# Patient Record
Sex: FEMALE
[De-identification: names, ages, dates, MRNs, and addresses within clinical notes are randomized per-mention and may not be internally consistent; named-entity substitution may affect disease eponyms.]

---

## 2019-09-19 ENCOUNTER — HOSPITAL ENCOUNTER (OUTPATIENT)
Dept: HOSPITAL 92 - L&D/OP | Age: 29
Discharge: HOME | End: 2019-09-19
Attending: OBSTETRICS & GYNECOLOGY
Payer: SELF-PAY

## 2019-09-19 VITALS — TEMPERATURE: 98.7 F

## 2019-09-19 VITALS — BODY MASS INDEX: 40.9 KG/M2

## 2019-09-19 DIAGNOSIS — R10.9: ICD-10-CM

## 2019-09-19 DIAGNOSIS — R11.0: ICD-10-CM

## 2019-09-19 DIAGNOSIS — Z3A.28: ICD-10-CM

## 2019-09-19 DIAGNOSIS — O99.89: Primary | ICD-10-CM

## 2019-09-19 DIAGNOSIS — Z79.82: ICD-10-CM

## 2019-09-19 DIAGNOSIS — O24.415: ICD-10-CM

## 2019-09-19 LAB
BACTERIA UR QL AUTO: (no result) HPF
GLUCOSE SERPL-MCNC: 72 MG/DL (ref 70–105)
LEUKOCYTE ESTERASE UR QL STRIP.AUTO: 75 LEU/UL
RBC UR QL AUTO: (no result) HPF (ref 0–3)
WBC UR QL AUTO: (no result) HPF (ref 0–3)

## 2019-09-19 PROCEDURE — 82947 ASSAY GLUCOSE BLOOD QUANT: CPT

## 2019-09-19 PROCEDURE — 81001 URINALYSIS AUTO W/SCOPE: CPT

## 2019-09-19 PROCEDURE — 36416 COLLJ CAPILLARY BLOOD SPEC: CPT

## 2019-09-19 PROCEDURE — 36415 COLL VENOUS BLD VENIPUNCTURE: CPT

## 2019-09-19 NOTE — PDOC.FPROB
FMR OB H&P: HPI





- History of Present Illness


Chief Complaint: nausea, abd pain


Indentification: 27 yo  at 28.6 wga by 1T U/S


History of Present Illness: 





Pt here complaining of abd pain felt like cramping all over abdomen this AM at 

0300 and lasted until 0800. Felt "bubbly" and tried drinking water. Sparkling 

water made it worse. Reports eating chipotle last night and her  had abd 

pain and nausea too. Pt reports nausea without vomiting. After abd pain resolved

, pt reports lightheadedness when getting up to go to work. This worsened w/ 

activity. She last ate around 1 PM and has not been taking much po water lately 

but has been urinating more. Sick contacts include colleagues at work who have 

cough, cold sx, and abdominal pain. Has 2nd grader at home who goes to school; 

he has not been sick. Pt called [facility] and was instructed to buy pepcid for acid 

reflux but to come here if she did not get better. She did not take pepcid as 

she felt worse when she went to pick it up. 





Pt has GDM. Fasting sugar this AM was 107, which is higher than normal for her. 

At home 3 hr postprandial was 147.





Pt's nausea, abd pain, and lightheadedness had resolved at the time she was 

seen.


Primary Care Physician: 





KIESHAC: Ryann





FMR OB H&P: Current Pregnancy





- Prenatal Care


: 3


Para: 1011


Gestational age: 28.6


Due date: 2019


Dating Criteria: 1T U/S


Course/Complications: 





1. GDM on metformin





FMR OB H&P: History





- Past Medical History


PMH: 





None





- OB History


OB History: 





1st pregnancy:  at term, no complications


2nd: SAB at <13 wga








- GYN History


GYN History: 





denies





- Surgical History


Sx History: 





denies





- Social History


Social History: 





Lives at home w/  and son.


Denies smoking, alcohol, drugs





FMR OB H&P: Medications





- Current


Home Medications: 


 











 Medication  Instructions  Recorded  Confirmed  Type


 


Aspirin [Ecotrin] 81 mg PO DAILY 19 History


 


metFORMIN [Glucophage] 1 tab PO DAILY 19 History











Allergies/Adverse Reactions: 


 Allergies











Allergy/AdvReac Type Severity Reaction Status Date / Time


 


No Known Allergies Allergy   Unverified 19 18:35














FMR OB H&P: ROS





- Review of Systems


General: reports: fever/chills (chills), night sweats, fatigue.  denies: weight/

appetite/sleep changes


Eyes: denies: vision changes


ENT: denies: nasal congestion, rhinorrhea, sinus pain/pressure, sore throat


Cardiovascular: denies: chest pain, edema


Respiratory: reports: shortness of breath (minimal, no).  denies: cough


Gastrointestinal: reports: abdominal pain, cramping, nausea.  denies: vomiting, 

diarrhea, constipation


Genitourinary (Female): reports: polyuria.  denies: incontinence, dysuria, 

hematuria, vaginal discharge, vaginal pain, vaginal bleeding, contractions, 

vaginal pressure


Musculoskeletal: denies: pain, stiffness


Neurologic: denies: numbness, syncope, seizures, weakness, loss of 

counsciousness, headache


Integumentary: denies: itching, rash


Breast: denies: skin changes


Endocrine: reports: polyuria.  denies: polydipsia, polyphagia


Hematologic/Lymphatic: denies: prolonged or excessive bleeding


Psychological: denies: depression, anxiety





FMR OB H&P: Vital Signs





- Maternal


Vital signs: 


 Vital Signs - First Documented











Temp


 


 98.7 F 


 


 19 18:27





/72


HR 98








- Fetal Heart Tones


Baseline: 140 (reactive)


Variability: moderate


Acceleration: present


Deceleration: variable (intermittent)


[place] contractions every: none





FMR OB H&P: Physical Exam





- Physical Exam


General: NAD, awake, alert and oriented


HEENT: normocephalic and atraumatic, conjunctiva clear, no scleral icterus, 

grossly normal hearing


Neck: supple, trachea midline, no LAD


Heart: RRR, normal S1/S2, no murmurs/rubs/gallops, no edema


General: CTAB, no respiratory distress


Abdomen: soft, gravid, non-tender, bowel sound present


Skin: no rash


Lymphatic: no unusual bruising or bleeding, no purpura, no petechia


Psychiatric: intact recent and remote memory, normal mood and affect





- Pelvic Exam


SVE: deferred





FMR OB H&P: Results





- Labs


Lab results: 





accucheck b





FMR OB H&P: A/P


Disposition: 





Observe on L&D


Discussion: 


Date/Time: 19 1950





Nausea, abd pain:


- Pt pain resolved by the time we spoke to her


- VSS


- bG 62, pt was given food and water


- UA pending, but not concerned for any abnormal findings.


- Pt will be discharged home.


- Return precautions given


- Encouraged PO water and fluid intake frequently especially while at work


- Patient may take tums or pepcid for GERD symptoms





This H&P was discussed with Dr. Johnson who agrees with the above documentation 

and plan.





Signature: 





TARIQ Duran MD


[id number]

## 2019-10-31 NOTE — ULT
EXAM:

Right lower extremity venous Doppler



HISTORY:

Right lower extremity swelling/edema in pregnant female patient.



FINDINGS:

Grayscale, color-flow, Doppler evaluation, spectral analysis of the right lower extremity venous stru
ctures is performed with 2-D imaging. The right common femoral, superficial femoral, popliteal,

posterior tibial, proximal greater saphenous and profunda femoral veins are imaged.



There is normal luminal compressibility, flow, and augmentation in the visualized deep venous structu
res of the right lower extremity.



IMPRESSION:

No evidence of a deep vein thrombosis in the visualized deep venous structures right lower extremity.




Reported By: Rodney Nathan 

Electronically Signed:  10/31/2019 1:22 PM

## 2019-11-14 NOTE — PDOC.FPROB
FMR OB H&P: HPI





- History of Present Illness


Chief Complaint: Elevated Blood Pressures


History of Present Illness: 


28 yo F  @ 36.6 wks by 1T US with gestational DM who presents for 

elevated blood pressures from clinic. She says she has been feeling fine. She 

checks her BG 4x/d and she says blood glucose remains <120. She started taking 

Metformin 2 months ago. Also, she started taking ASA 81 1 month ago. She has 

had 2 high blood pressure readings 1 week a part (140/79, 143/88). She denies 

any vaginal bleeding/discharge, LOF, nausea, vomiting, dysuria, Headache, and 

vision changes. She endorses swelling, mostly in feet but from time to time in 

hands and in face when she wakes.


Primary Care Physician: 


ROME Lewis





FMR OB H&P: Current Pregnancy





- Prenatal Care


: 3


Para: 1011


Gestational age: 36.6





- OB Labs


Blood type: AB


RH: positive


Antibody Screen: negative


HIV: negative


RPR: negative


HepBsAg: negative


Rubella: immune


Gonorrhea: negative


Chlamydia: negative


3 hour GTT: 99, 192, 203, 200


A1c: 5.8


GBS: positive


H&H: 11.9/34.5


Platelets: 340





FMR OB H&P: History





- Past Medical History


PMH: 


None





- OB History


OB History: 


Gestational DM





- GYN History


GYN History: 


Ab pap in 2013 LGSIL in 2013. Colpo in 2013 was normal.





- Surgical History


Sx History: 


None





- Social History


Social History: 


No alcohol, drugs, or tobacco use.





- Family History


Family History: 


Father: DM


Jaundice: Daughter


Asthma: No one





FMR OB H&P: Medications





- Current


Home Medications: 


 











 Medication  Instructions  Recorded  Confirmed  Type


 


Aspirin Chewable 81 mg PO DAILY 19 History


 


Ferrous Sulfate 324 mg PO BIDPC 19 History


 


metFORMIN [Glucophage] 500 mg PO BID-WM 19 History











Allergies/Adverse Reactions: 


 Allergies











Allergy/AdvReac Type Severity Reaction Status Date / Time


 


No Known Allergies Allergy   Unverified 19 13:06














FMR OB H&P: ROS





- Review of Systems


General: denies: fever/chills


Eyes: denies: vision changes


ENT: denies: nasal congestion, sore throat


Cardiovascular: reports: edema.  denies: chest pain


Respiratory: reports: shortness of breath.  denies: cough, congestion


Gastrointestinal: denies: abdominal pain, nausea, vomiting, diarrhea, 

constipation


Genitourinary (Female): denies: dysuria, vaginal discharge, vaginal bleeding, 

contractions


Musculoskeletal: denies: pain, tenderness


Neurologic: denies: numbness, weakness


Integumentary: denies: rash


Hematologic/Lymphatic: denies: enlarged lymph nodes





FMR OB H&P: Vital Signs





- Maternal


Vital signs: 


 Vital Signs - First Documented











Temp Pulse Resp BP


 


 98.2 F   88   18   141/74 H


 


 19 13:04  19 13:04  19 13:04  19 13:04














- Fetal Heart Tones


Baseline: 150


Variability: moderate


Acceleration: present


Deceleration: absent


Category: category 1





FMR OB H&P: Physical Exam





- Physical Exam


General: NAD, awake, alert and oriented


HEENT: normocephalic and atraumatic, PERRLA, EOMI, MMM, conjunctiva clear, no 

scleral icterus, normal nasal mucosa, oropharynx clear, good dention


Neck: supple, no LAD


Chest: non-tender to palpation


Breast: symmetric


Heart: RRR, normal S1/S2, no murmurs/rubs/gallops, pulses present


Deviation from normal: Slight edema present


General: CTAB, no respiratory distress, good air movement, no rales/rhonchi, no 

wheezing, no retractions


Abdomen: soft, gravid, non-tender, bowel sound present


Musculoskeletal: pulses present, FROM in all four extremities


Neurological: cranial nerves II through XII intact


Skin: no rash, good tugor, no jaundice


Lymphatic: no unusual bruising or bleeding





FMR OB H&P: Results





- Labs


Lab results: 


 Laboratory Results - last 24 hr











  19





  14:20 14:46 14:53


 


WBC    7.8


 


RBC    4.06 L


 


Hgb    11.6 L


 


Hct    34.6 L


 


MCV    85.3


 


MCH    28.6


 


MCHC    33.5


 


RDW    15.7 H


 


Plt Count    247


 


MPV    9.2


 


Neutrophils %    58.1


 


Lymphocytes %    31.6


 


Monocytes %    8.8


 


Eosinophils %    0.9


 


Basophils %    0.6


 


Neutrophils #    4.6


 


Lymphocytes #    2.5


 


Monocytes #    0.7 H


 


Eosinophils #    0.1


 


Basophils #    0.0


 


POC Glucose   75 


 


U Random Total Protein  14  


 


Urine Creatinine  69.95  














FMR OB H&P: A/P





- Problem List


(1) Gestational hypertension


Current Visit: Yes   Status: Acute   Code(s): O13.9 - GESTATIONAL HTN W/O 

SIGNIFICANT PROTEINURIA, UNSP TRIMESTER   





(2) Gestational diabetes mellitus


Current Visit: Yes   Status: Acute   Code(s): O24.419 - GESTATIONAL DIABETES 

MELLITUS IN PREGNANCY, UNSP CONTROL   


Disposition: 


28 yo F  @ 36.6 wks by 1T US with gestational DM who presents for 

elevated blood pressures from clinic.





1. Gestational HTN


* Monitor BP


* Multiple BP 140s/80s


* ASA 81 mg daily


* Pre-E workup: P/C ratio: 0.2 g/d, no rise in liver enzymes, normal plt count





2. Gestational DM


* A2


* Checks BG 4x/d


* BG <120


* Currently on Metformin 500 mg BID





Diet: Consistent Carb


Activity: As tolerated


IVF: LR @ 125


PCP: [facility]





Dispo: On L&D, will admitted and induce at midnight.


Discussion: 


Date/Time: 19 1524











This H&P was discussed with  [] and  [] who agree with the above 

documentation and plan.








Addendum - Attending





- Attending Attestation


Date/Time: 19 1620





I personally evaluated the patient and discussed the management with Dr. Pace 

and Dr Lewis. 


I agree with the History, Examination, Assessment and Plan documented above 

with any addition or exceptions noted below.


Induction between 37-38w6d is permissible.  In light of GDM adn GHTN, we will 

be admitting and inducing in early term.

## 2019-11-15 NOTE — PDOC.LDPN
Labor & Delivery Progress Note





- Subjective


Subjective: comfortable, painful contractions, no concerns





- Objective


Vital signs reviewed and normal: yes (/77 most recently)


General: NAD, resting, breathing through contractions


Uterine fundus: non tender


SVE: 


Dilation: 4


Effacement: 50% (40)


Station: -3


FHT: category 1 (accels, no decels, baseline 140), variability present (moderate

)


[place] contractions every: q4-5 min per pt, not picked up on monitor


Other exam findings: intact





- Assessment


(1) Third trimester pregnancy


Code(s): Z34.93 - ENCNTR FOR SUPRVSN OF NORMAL PREG, UNSP, THIRD TRIMESTER   

Current Visit: Yes   Status: Acute   





(2) Gestational diabetes mellitus


Code(s): O24.419 - GESTATIONAL DIABETES MELLITUS IN PREGNANCY, UNSP CONTROL   

Current Visit: Yes   Status: Acute   





(3) Gestational hypertension


Code(s): O13.9 - GESTATIONAL HTN W/O SIGNIFICANT PROTEINURIA, UNSP TRIMESTER   

Current Visit: Yes   Status: Acute   


Plan: continue plan of care, pitocin for augmentation, other (Cook balloon out 

@ )


-: 


28 yo F  @ 37.0 wks by 1T US with gestational DM who presents for 

elevated blood pressures from clinic, now undergoing medically indicated IOL 

for gDM and gHTN.





# Medically indicated IOL


- 37wks by first trimester US


- Check @ 1915/-3, posterior. Reboleldo 3 - previous checks found to be only 

of exterior OS


- Cat 1 strip, accels no decels, baseline 140


- Contractions every 4-5 min.


- Balloon out at 1900


- Discussed options of continuation of pit vs break from medications and to 

restart after meal and shower. Pt will discuss with family and we will proceed 

as decided.


- Recheck 0400 after restarting pit once decided


- Cont to monitor on FHT





# GBS unknown


- GBS + in prior pregancy per patient. Current GBS status known


- Cont [facility] tx





# Gestational HTN


- 1 severe range pressure of 170/90, all others have been non-severe range


- Prn hydralazine. Will start Mag if continues to have severe range BP


- Pre E labs with Pr/Cr ratio of 0.2. Plts and LFTs WNL


- Denies any SOB, HA, vision changes


- Cont to monitor closely





# Gestational DM


- Cat A2, BG checks QID, BG currently stable


- cont to monitor





Diet: NPO with ice chips


Activity: As tolerated


IVF: LR @ 125


PCP: [facility]





Dispo: On L&D, medically indicated induction of labor for gDM and gHTN. Cont 

labor checks and close monitoring. Mom currently does not want pain meds or 

epidural. Possible break from induction if mom chooses.

## 2019-11-15 NOTE — PDOC.LDPN
Labor & Delivery Progress Note





- Subjective


Subjective: comfortable, painful contractions, no concerns





- Objective


Vital signs reviewed and normal: yes


General: NAD, resting, breathing through contractions


Uterine fundus: non tender


SVE: 0500


Dilation: 2


Effacement: 50%


Station: -3


FHT: category 1 (accels), variability present (moderate. very short episodes of 

minimal variability)


[place] contractions every: 3min


Other exam findings: intact





- Assessment


(1) Third trimester pregnancy


Code(s): Z34.93 - ENCNTR FOR SUPRVSN OF NORMAL PREG, UNSP, THIRD TRIMESTER   

Current Visit: Yes   Status: Acute   





(2) Gestational diabetes mellitus


Code(s): O24.419 - GESTATIONAL DIABETES MELLITUS IN PREGNANCY, UNSP CONTROL   

Current Visit: Yes   Status: Acute   





(3) Gestational hypertension


Code(s): O13.9 - GESTATIONAL HTN W/O SIGNIFICANT PROTEINURIA, UNSP TRIMESTER   

Current Visit: Yes   Status: Acute   


Plan: continue plan of care, labor augmentation (cytotec)


-: 


30 yo F  @ 37.0 wks by 1T US with gestational DM who presents for 

elevated blood pressures from clinic, now undergoing IOL for gDM and gHTN.





#IOL


- 37wks by first trimester US


- Check @ 0500 2/50/-3, mid-position. Rebolledo 4


- Cat 1 strip. Very short episodes of minimal variability but recovers


- Contractions every 2-3 min. Will monitor closely and place cytotec if 

contractions space out.


- cont to monitor on FHT





#GBS unknown


- GBS + in prior pregancy per patient


- Current GBS status known


- Cont [facility] tx





#Gestational HTN


- 1 severe range pressure of 170/90, all others have been non-severe range


- prn hydralazine. Will start Mag if continues to have severe range BP


- Pre E labs with Pr/Cr ratio of 0.2. Plts and LFTs WNL


- Denies any SOB, HA, vision changes


- cont to monitor closely





#Gestational DM


- Cat A2, BG checks QID, BG currently stable


- cont to monitor





Diet: NPO with ice chips


Activity: As tolerated


IVF: LR @ 125


PCP: [facility]





Dispo: On L&D, induction of labor for gDM and gHTN. Cont labor checks and close 

monitoring.

## 2019-11-15 NOTE — PDOC.LDPN
Labor & Delivery Progress Note





- Subjective


Subjective: comfortable, no concerns





- Objective


Abnormal vital signs: Elevated BP of 170/90, repeat to 130/80, no more severe 

range


General: NAD


Uterine fundus: non tender


SVE: 0030


Dilation: 1


Effacement: 50%


Station: -3


FHT: category 1 (accels, no decels), variability present (moderate)


[place] contractions every: intermittent


Other exam findings: intact


Procedures: US - cephalic presentation


Plan: continue plan of care, labor augmentation (cytotec placed)


-: 


28 yo F  @ 37.0 wks by 1T US with gestational DM who presents for 

elevated blood pressures from clinic, now undergoing IOL for gDM and gHTN.





#IOL


- 37wks by first trimester US


- Check @ 0030 /-3, posterior


- Cat 1 strip


- Cytotec placed, will recheck in 3 hrs and possibly place 2nd cytotec


- cont to monitor on FHT





#GBS unknown


- GBS + in prior pregancy per patient


- Current GBS status known


- Will begin treatment with [facility] for GBS prophylaxis





#Gestational HTN


- 1 severe range pressure of 170/90, all others have been non-severe range


- prn hydralazine. Will start Mag if continues to have severe range BP


- Pre E labs with Pr/Cr ratio of 0.2. Plts and LFTs WNL


- Denies any SOB, HA, vision changes


- cont to monitor closely





#Gestational DM


- Cat A2, BG checks QID, BG currently stable


- cont to monitor


* A2


* Checks BG 4x/d


* BG <120


* Currently on Metformin 500 mg BID





Diet: NPO with ice chips


Activity: As tolerated


IVF: LR @ 125


PCP: [facility]





Dispo: On L&D, induction of labor for gDM and gHTN

## 2019-11-15 NOTE — PDOC.LDPN
Labor & Delivery Progress Note





- Subjective


Subjective: comfortable





- Objective


Vital signs reviewed and normal: yes


General: NAD


Uterine fundus: non tender


Dilation: 4


Effacement: 50%


Station: -3


FHT: category 1


[place] contractions every: 3 minutes





- Assessment


(1) Gestational hypertension


Code(s): O13.9 - GESTATIONAL HTN W/O SIGNIFICANT PROTEINURIA, UNSP TRIMESTER   

Current Visit: Yes   Status: Acute   





(2) Gestational diabetes mellitus


Code(s): O24.419 - GESTATIONAL DIABETES MELLITUS IN PREGNANCY, UNSP CONTROL   

Current Visit: Yes   Status: Acute   





(3) Third trimester pregnancy


Code(s): Z34.93 - ENCNTR FOR SUPRVSN OF NORMAL PREG, UNSP, THIRD TRIMESTER   

Current Visit: Yes   Status: Acute   


Plan: pitocin for augmentation


-: 


28 yo F  @ 37.0 wks by 1T US with gestational DM who presents for 

elevated blood pressures from clinic, now undergoing IOL for gDM and gHTN.





1. IOL


* 37wks by first trimester US


* Check @ 1100 4/50/-3, posterior. Rebolledo 5


* Cat 1 strip. 


* Contractions every 2.5 min. Will monitor closely and place cytotec if 

contractions space out.


* ont to monitor on FHT


* Starting pit





2. GBS unknown


* GBS + in prior pregancy per patient


* Current GBS status known


* Cont [facility] tx





3. Gestational HTN


* 1 severe range pressure of 170/90, all others have been non-severe range


* Prn hydralazine. Will start Mag if continues to have severe range BP


* Pre E labs with Pr/Cr ratio of 0.2. Plts and LFTs WNL


* Denies any SOB, HA, vision changes


* Cont to monitor closely





4. Gestational DM


* Cat A2, BG checks QID, BG currently stable


* cont to monitor





Diet: NPO with ice chips


Activity: As tolerated


IVF: LR @ 125


PCP: [facility]





Dispo: On L&D, induction of labor for gDM and gHTN. Cont labor checks and close 

monitoring. Mom currently does not want pain meds or epidural. Starting pit

## 2019-11-15 NOTE — PDOC.LDPN
Labor & Delivery Progress Note





- Subjective


Subjective: painful contractions





- Objective


Vital signs reviewed and normal: yes (except few BP's elevated to 140s/90s range

)


General: NAD


SVE: 2/75/-3 @ 1430 by Dr. Baker, cooks balloon placed


[place] contractions every: 2-4 min


Procedures: cook balloon placed


Plan: continue plan of care (Continue pitocin)

## 2019-11-15 NOTE — PDOC.LDPN
Labor & Delivery Progress Note





- Subjective


Subjective: painful contractions





- Objective


Vital signs reviewed and normal: yes


General: breathing through contractions


Uterine fundus: palpable contractions


Dilation: 3


Effacement: 75%


Station: -1


FHT: category 1, variability present


[place] contractions every: 2.5 minutes





- Assessment


(1) Gestational hypertension


Code(s): O13.9 - GESTATIONAL HTN W/O SIGNIFICANT PROTEINURIA, UNSP TRIMESTER   

Current Visit: Yes   Status: Acute   





(2) Gestational diabetes mellitus


Code(s): O24.419 - GESTATIONAL DIABETES MELLITUS IN PREGNANCY, UNSP CONTROL   

Current Visit: Yes   Status: Acute   





(3) Third trimester pregnancy


Code(s): Z34.93 - ENCNTR FOR SUPRVSN OF NORMAL PREG, UNSP, THIRD TRIMESTER   

Current Visit: Yes   Status: Acute   


Plan: pitocin for augmentation


-: 


30 yo F  @ 37.0 wks by 1T US with gestational DM who presents for 

elevated blood pressures from clinic, now undergoing IOL for gDM and gHTN.





1. IOL


* 37wks by first trimester US


* Check @ 0845 3/75/-3, posterior. Rebolledo 4


* Cat 1 strip. 


* Contractions every 2.5 min. Will monitor closely and place cytotec if 

contractions space out.


* ont to monitor on FHT


* Will start pit @ 1030





2. GBS unknown


* GBS + in prior pregancy per patient


* Current GBS status known


* Cont [facility] tx





3. Gestational HTN


* 1 severe range pressure of 170/90, all others have been non-severe range


* Prn hydralazine. Will start Mag if continues to have severe range BP


* Pre E labs with Pr/Cr ratio of 0.2. Plts and LFTs WNL


* Denies any SOB, HA, vision changes


* Cont to monitor closely





4. Gestational DM


* Cat A2, BG checks QID, BG currently stable


* cont to monitor





Diet: NPO with ice chips


Activity: As tolerated


IVF: LR @ 125


PCP: [facility]





Dispo: On L&D, induction of labor for gDM and gHTN. Cont labor checks and close 

monitoring. Mom currently does not want pain meds or epidural.

## 2019-11-16 NOTE — PDOC.LDPN
Labor & Delivery Progress Note





- Subjective


Subjective: comfortable, no concerns, other





- Objective


Vital signs reviewed and normal: yes


General: NAD, resting


Uterine fundus: non tender


SVE: 0400


Dilation: 4


Effacement: 50% (40)


Station: -3


FHT: category 1 (accels, no decels, moderate variability, baseline 140), 

variability present


[place] contractions every: q5+ minutes


Other exam findings: intact





- Assessment


(1) Third trimester pregnancy


Code(s): Z34.93 - ENCNTR FOR SUPRVSN OF NORMAL PREG, UNSP, THIRD TRIMESTER   

Current Visit: Yes   Status: Acute   





(2) Gestational diabetes mellitus


Code(s): O24.419 - GESTATIONAL DIABETES MELLITUS IN PREGNANCY, UNSP CONTROL   

Current Visit: Yes   Status: Acute   





(3) Gestational hypertension


Code(s): O13.9 - GESTATIONAL HTN W/O SIGNIFICANT PROTEINURIA, UNSP TRIMESTER   

Current Visit: Yes   Status: Acute   


Plan: continue plan of care, labor augmentation (cytotec)


-: 


30 yo F  @ 37.1 wks by 1T US with gestational DM who presents for 

elevated blood pressures from clinic, now undergoing medically indicated IOL 

for gDM and gHTN.





# Medically indicated IOL


- 37.1wks by first trimester US


- Check @ 0400 4/40/-3, posterior. Rebolledo 4.


- Cat 1 strip, accels no decels, baseline 140


- Contractions q5+ minutes


- Will place a 3rd cytotec and recheck in 4hrs


- Cont to monitor on FHT





# GBS unknown


- GBS + in prior pregancy per patient. Current GBS status known


- Cont PNC tx





# Gestational HTN


- 1 severe range pressure of 170/90, all others have been non-severe range


- Prn hydralazine. Will start Mag if continues to have severe range BP


- Pre E labs with Pr/Cr ratio of 0.2. Plts and LFTs WNL


- Denies any SOB, HA, vision changes


- Cont to monitor closely





# Gestational DM


- Cat A2, BG checks QID, BG currently stable


- cont home metformin


- cont to monitor





Diet: NPO with ice chips


Activity: As tolerated


IVF: LR @ 125


PCP: [facility]





Dispo: On L&D, medically indicated induction of labor for gDM and gHTN. Cont 

labor checks and close monitoring. Mom currently does not want pain meds or 

epidural. Cytotec placed.

## 2019-11-16 NOTE — PDOC.LDPN
Labor & Delivery Progress Note





- Subjective


Subjective: painful contractions





- Objective


Vital signs reviewed and normal: yes


General: breathing through contractions


SVE: 6/95/-1


FHT: category 1


[place] contractions every: q2 min


-: 


 A/P: 1) IUP@37.1 weeks undergoing induction for gHTN


      - almost completely effaced now and head has descended more; continue 

current pitocin as she has adequate [place] units. Recheck in 2 hours. 


      Continue position changes. 


     - Category 1 FHTS.

## 2019-11-16 NOTE — PDOC.LDPN
Labor & Delivery Progress Note





- Subjective


Subjective: comfortable, no concerns





- Objective


Vital signs reviewed and normal: yes


General: NAD, resting


Uterine fundus: non tender


SVE: 0015


Dilation: 4


Effacement: 25% (30)


Station: -3


FHT: category 1 (accels, no deccels, baseline 140), variability present (

moderate)


[place] contractions every: intermittent


Other exam findings: intact, thick, posterior





- Assessment


(1) Third trimester pregnancy


Code(s): Z34.93 - ENCNTR FOR SUPRVSN OF NORMAL PREG, UNSP, THIRD TRIMESTER   

Current Visit: Yes   Status: Acute   





(2) Gestational diabetes mellitus


Code(s): O24.419 - GESTATIONAL DIABETES MELLITUS IN PREGNANCY, UNSP CONTROL   

Current Visit: Yes   Status: Acute   





(3) Gestational hypertension


Code(s): O13.9 - GESTATIONAL HTN W/O SIGNIFICANT PROTEINURIA, UNSP TRIMESTER   

Current Visit: Yes   Status: Acute   


Plan: continue plan of care, labor augmentation (cytotec)


-: 


30 yo F  @ 37.1 wks by 1T US with gestational DM who presents for 

elevated blood pressures from clinic, now undergoing medically indicated IOL 

for gDM and gHTN.





# Medically indicated IOL


- 37.1wks by first trimester US


- Check @ 0015 4/-3, posterior. Rebolledo 3.


- Cat 1 strip, accels no decels, baseline 140


- Contractions intermittent


- Balloon out at 1900


- Pt had break from last check will midnight


- Will place cytotec and recheck in 4hrs


- Cont to monitor on FHT





# GBS unknown


- GBS + in prior pregancy per patient. Current GBS status known


- Cont PNC tx





# Gestational HTN


- 1 severe range pressure of 170/90, all others have been non-severe range


- Prn hydralazine. Will start Mag if continues to have severe range BP


- Pre E labs with Pr/Cr ratio of 0.2. Plts and LFTs WNL


- Denies any SOB, HA, vision changes


- Cont to monitor closely





# Gestational DM


- Cat A2, BG checks QID, BG currently stable


- cont home metformin


- cont to monitor





Diet: NPO with ice chips


Activity: As tolerated


IVF: LR @ 125


PCP: [facility]





Dispo: On L&D, medically indicated induction of labor for gDM and gHTN. Cont 

labor checks and close monitoring. Mom currently does not want pain meds or 

epidural. Cytotec placed.

## 2019-11-16 NOTE — PDOC.LDPN
Labor & Delivery Progress Note





- Subjective


Subjective: painful contractions





- Objective


Vital signs reviewed and normal: yes


Abnormal vital signs: Occasional /80s


General: breathing through contractions


SVE: 70/-2


FHT: category 1


[place] contractions every: q2 min


-: 


 A/P: 1) IUP@37.1 weeks with gHTN  undergoing induction


   - no cervical change in 2 hours; last exam @14:00 still 670/-2; adequate 

[place] units since placement of IUPC.


   - discussed concern for no cervical change in last 2 hours despite adequate 

[place] units, position changes, deep breathing relaxation techniques.      

   


          Discussed option of epidural. Patient does not desire epidural as she 

had one with her prior pregnancy and it did not work; give her pain relief. 

Discussed 


   that if she does not make change would need to proceed with  as 

mode of delivery. All questions answered for patient and family.

## 2019-11-16 NOTE — PDOC.LDPN
Labor & Delivery Progress Note





- Subjective


Subjective: painful contractions





- Objective


Vital signs reviewed and normal: yes


General: breathing through contractions


SVE: 5-6/70%/-2


FHT: category 1


[place] contractions every: q2 min


AROM: clear fluid


-: 


A/P: 1) IUP@ 37.1 weeks with gHTN undergoing induction- fetal head now well 

applied- AROM clear fluid. Continue pitocin. May place IUPC and FSE. 

Progressing slowly. BP stable.

## 2019-11-16 NOTE — PDOC.LDPN
Labor & Delivery Progress Note





- Subjective


Subjective: painful contractions





- Objective


Vital signs reviewed and normal: yes


General: breathing through contractions


SVE: 5/50%/-2


FHT: category 1


[place] contractions every: q2-3 min


-: 


A/P: 1) IUP@ 37.2 weeks undergoing induction- Fetal head has descended some 

still ballotable but more in pelvis and more reachable. Continue pitocin. 


        2) Gestational HTN - Mainly 120-130/70-80s; occasional 140/80s; 

asymptomatic


        3) GBS unknown but positive in prior pregnancy - continue PCN 

prophylaxis

## 2019-11-16 NOTE — PDOC.LDPN
Labor & Delivery Progress Note





- Subjective


Subjective: comfortable





- Objective


Vital signs reviewed and normal: yes


General: NAD


Uterine fundus: non tender


Dilation: 5


Effacement: 50%


Station: -3


FHT: category 1


[place] contractions every: None





- Assessment


(1) Gestational hypertension


Code(s): O13.9 - GESTATIONAL HTN W/O SIGNIFICANT PROTEINURIA, UNSP TRIMESTER   

Current Visit: Yes   Status: Acute   





(2) Gestational diabetes mellitus


Code(s): O24.419 - GESTATIONAL DIABETES MELLITUS IN PREGNANCY, UNSP CONTROL   

Current Visit: Yes   Status: Acute   





(3) Third trimester pregnancy


Code(s): Z34.93 - ENCNTR FOR SUPRVSN OF NORMAL PREG, UNSP, THIRD TRIMESTER   

Current Visit: Yes   Status: Acute   


Plan: pitocin for augmentation


-: 


28 yo F  @ 37.1 wks by 1T US with gestational DM who presents for 

elevated blood pressures from clinic, now undergoing medically indicated IOL 

for gDM and gHTN.





1. Medically indicated IOL


* 37.1wks by first trimester US


* Check @ 0750 5/50/-3, posterior. Rebolledo 7.


* Cat 1 strip, accels no decels, baseline 140s


* Contractions none


* Starting pit


* Cont to monitor on FHT





2. GBS unknown


* GBS + in prior pregancy per patient. Current GBS status known


* Cont PNC tx





3. Gestational HTN


* 1 severe range pressure of 170/90, all others have been non-severe range


* Prn hydralazine. Will start Mag if continues to have severe range BP


* Pre E labs with Pr/Cr ratio of 0.2. Plts and LFTs WNL


* Denies any SOB, HA, vision changes


* BP is 130s/70s


* Cont to monitor closely





4. Gestational DM


* Cat A2, BG checks QID, BG currently stable


* ont home metformin


* Cont to monitor





Diet: NPO with ice chips


Activity: As tolerated


IVF: LR @ 125


PCP: [facility]





Dispo: On L&D, medically indicated induction of labor for gDM and gHTN. Cont 

labor checks and close monitoring. Mom currently does not want pain meds or 

epidural. Pitocin getting started.

## 2019-11-16 NOTE — PDOC.OPDEL
OB Operative/Delivery Note


Delivery Dr/Surgeon: Akhil Perez


Pre-Delivery Diagnosis: medically indicated induction


Procedure/Post Delivery Dx: spontaneous vaginal delivery


Weeks gestation: 37 (37.1)


Anesthesia: none





- Additional Findings/Plan


Placenta delivered: spontaneous


Repaired Obstetrical Laceration: other (abrasion, hemostatic)


 findings: other


Compilations/Other Findings: 





Delivering Physicians: Jennifer Perez & Lashon Landaverde


Procedure: Term spontaneous vaginal delivery


Anesthesia: None


QBL:275cc 





Pre-op Diagnosis: 


1. Term intrauterine pregnancy in labor


2. A2GDM


3. Gestational HTN


4. GBS Unknown


    


Post-op Diagnosis: 


1. Term intrauterine pregnancy, delivered


2. same as above


3. same as above


4. same as above s/p adequate ppx





Indications: A 28 y/o female now  admitted to L&D for medically 

indicated induction for gHTN.





Delivery Note: This is 28yo  F   @37.1 wks with A2GDM, gHTN who 

delivered a viable M  infant on  @ 1825. A  vigorous M was delivered over 

an intact perineum  in the occipitoanterior position. Nuchal cord x1 which was 

reduced. The head was held down and mouth and nares were bulb suctioned and 

baby stimulated. Cord clamped and cut and cord blood collected. Placenta 

delivered intact via [place] presentation with a 3 vessel cord noted. Fundal 

massage was performed and the fundus was firm. The cervix and vagina were 

inspected and a perineal abrasion was discovered which was hemostatic.  Apgars 

were 8/9 at 1 & 5 minutes, respectively. Patient tolerated delivery well and 

went to postpartum after routine recovery/care.


Post delivery plan: routine recovery





Addendum - Attending





- Attending Attestation


Date/Time: 19





I was present, assisted and supervised the  of a viable male infant over an 

intact perineum to this 30 yo  @37.1 weeks. Apgars 8/9. Nuchal cord x 1 

reduced on perineum. Shoulders and body delivered easily. Placenta delivered 

spontaneously and intact. Small perineal abrasion. Hemostatic. HFG=495 mL. 

mother and infant in stable condition. Residents: Akhil.

## 2019-11-17 NOTE — PDOC.PP
Post Partum Progress Note


Post Partum Day #: 1


Subjective: 





Patient doing well. No headache, visual changes, chest pain. Breastfeeding 

without issues. No concerns voiced at this time.


PO intake tolerated: yes


Flatus: yes


Ambulation: yes


 Vital Signs (12 hours)











  Temp Pulse Resp BP Pulse Ox


 


 19 23:15   94   143/80 H 


 


 19 23:00  99.0 F  107 H   173/80 H 


 


 19 21:10  99.1 F  106 H  18  133/64  99








 Weight











Weight                         108.862 kg

















- Physical Examination


General: NAD


Cardiovascular: no m/r/g, RRR


Respiratory: non-labored breathing


Abdominal: + bowel sounds, lochia


Psychiatric: A&Ox3, normal affect


Result Diagrams: 


 19 04:08





 11/15/19 06:13


Additional Labs: 


 Post Partum Labs











Blood Type  AB POSITIVE   19  21:25    


 


Hep Bs Antigen  Non-Reactive S/CO (NonReactive)   19  19:56    











(1) Pregnancy, delivered


Code(s): O80 - ENCOUNTER FOR FULL-TERM UNCOMPLICATED DELIVERY   Status: Acute   





(2) Anemia


Code(s): D64.9 - ANEMIA, UNSPECIFIED   Status: Acute   





(3) Gestational diabetes mellitus


Code(s): O24.419 - GESTATIONAL DIABETES MELLITUS IN PREGNANCY, UNSP CONTROL   

Status: Acute   





(4) Gestational hypertension


Code(s): O13.9 - GESTATIONAL HTN W/O SIGNIFICANT PROTEINURIA, UNSP TRIMESTER   

Status: Acute   





- Assessment/Plan





s/p , postpartum day 1


-doing well


-breastfeeding well





Normocytic anemia


-Hb 11.5-> 9.8, patient feeling well, likely from blood loss, possible 

underlying ACD -will check iron level


-Start PO iron BID, VB minimal per nursing/pt, uterus firm





Possible cHTN; hx of gHTN


-BPs 170/80 with repeat 143/80 15 min later. Down to 126/77. Observe BPs today, 

if elevated will start on antihypertensive





GDM


-controlled


-ACHS/QHS


-accucheck postprandial 183, fasting 79 this AM


-consider 2hr GTT postpartum 





dispo: Home later today pending BPs/baby bilirubin, if not then home tmrw





Addendum - Attending





- Attending Attestation


Date/Time: 19 0553





I personally evaluated the patient and discussed the management with Dr. Paul


I agree with the History, Examination, Assessment and Plan documented above 

with any addition or exceptions noted below- Patient without complaints. 

Afebrile VSS. A/P: 1) PPD#1 s/p - continue routine postpartum care. 

Anticipate d/c in AM. 2) Anemia- continue iron/PNV.

## 2019-11-18 NOTE — PDOC.PP
Post Partum Progress Note


Post Partum Day #: 2


Subjective: 


Pt denies any complaints this morning. Abdominal pain is well controlled. Pt 

able to ambulate without difficulty. Notes some difficulty with breast feeding 

currently which she attributes to her milk not coming down yet. 


PO intake tolerated: yes


Flatus: yes


Ambulation: yes


 Vital Signs (12 hours)











  Temp Pulse Resp BP Pulse Ox


 


 19 04:27  98.6 F  75  18  108/53 L 


 


 19 00:46  98.5 F  78  18  106/59 L 


 


 19 19:40  97.8 F  88  18  118/69  100








 Weight











Weight                         108.862 kg

















- Physical Examination


General: NAD


Cardiovascular: no m/r/g, RRR


Respiratory: clear to auscultation bilaterally, non-labored breathing


Abdominal: + bowel sounds, lochia, no distention, appropriately TTP


Skin: no rash


Neurological: no gross focal deficits


Psychiatric: A&Ox3, normal affect


Result Diagrams: 


 19 06:28





 19 06:28


Additional Labs: 


 Post Partum Labs











Blood Type  AB POSITIVE   19  21:25    


 


Hep Bs Antigen  Non-Reactive S/CO (NonReactive)   19  19:56    











(1) Anemia


Code(s): D64.9 - ANEMIA, UNSPECIFIED   Status: Acute   





(2) Gestational diabetes mellitus


Code(s): O24.419 - GESTATIONAL DIABETES MELLITUS IN PREGNANCY, UNSP CONTROL   

Status: Acute   





(3) Pregnancy, delivered


Code(s): O80 - ENCOUNTER FOR FULL-TERM UNCOMPLICATED DELIVERY   Status: Acute   





- Assessment/Plan


s/p , postpartum day 2


-doing well


-breastfeeding, supplementing with formula currently due to no milk let down yet


   -pt requesting lactation consultant





Normocytic anemia


-Hb 11.5-> 9.8 -> 9.4 - will further trend CBC


-Patient feeling well, likely from blood loss in setting of iron deficiency 

anemia


-PO iron BID, VB minimal per nursing/pt, uterus firm





Possible cHTN


-Yesterday /80 with repeat 143/80 15 min later


-BP's since have all been WNL





GDM


-controlled


-ACHS/QHS


-accucheck fasting 99 this am


-consider 2hr GTT postpartum 





Hypokalemia


-3.2 this am


-replace w/ 40 meq PO





dispo: Baby is staying for phototherapy. Will have Mom meet with lact 

consultant. Expect DC tomorrow.





Addendum - Attending





- Attending Attestation


Date/Time: 19 1050





I personally evaluated the patient and discussed the management with Dr. Zavaleta


I agree with the History, Examination, Assessment and Plan documented above 

with any addition or exceptions noted below - Patient without complaints. 

Afebrile VSS. A/P: 1) PPD #2 s/p - plan to d/c today and place in bed and 

breakfast status due to infant receiving phototherapy. .

## 2019-11-20 NOTE — PQF
JEREMY Perales MD

[id number]                                                             

[id number]                             

                                   

CLINICAL DOCUMENTATION CLARIFICATION FORM:  POST DISCHARGE



Addendum to original discharge summary date:  __________________________________
____



Late entry note date:  _________________________________________________________
__











DATE: 19                                            ATTN:Pope Jeremy





Please exercise your independent, professional judgment in responding to the 
clarification form. 

Clinical indicators are provided on the bottom of this form for your review



Based on your clinical judgment, can you please specify the known or suspected 
condition being treated, evaluated or monitored?





___ Final Diagnosis on the Pathology report: acute chorioamnionitis, mild



___ Progress Notes indicate: placenta



Clarification of Pathology report:

Please check appropriate box(s):

 [  ] Agree w the pathology finding of:___________________________

 [  ] Other explanation of pathology findings (please specify)

 [  ] Other diagnosis ___________

 [  ] Unable to determine



For continuity of documentation, please document condition throughout progress 
notes and discharge summary.  Thank You.





CLINICAL INDICATORS - SIGNS/ SYMPTOMS / LABS

Path  "fetal membranes-acute chorioamnionitis, mild"

OP Note  "placenta delivered spontaneously and intact"

Labs WBC: =7.8 11/15=7.4 =10.5 =8.2





RISK FACTORS

HP -36 weeks gestation

OP Note -GDM

OP Note -GHTN

OP Note -s/p 

OP Note -Abrasion of perineum

PN -Anemia





TREATMENTS

Collected -Laboratory monitoring

MAR -IVF

MAR 11/15-Penicillin 2.5 mg IV











(This form is maintained as a part of the permanent medical record)

 [employer].  All Rights Reserved

[email or web address]    [not 
provided]

                                                              



FADIAD

## 2019-11-20 NOTE — PQF
JEREMY Perales MD

[id number]                                                             

[id number]                             

                                   

CLINICAL DOCUMENTATION CLARIFICATION FORM:  POST DISCHARGE



Addendum to original discharge summary date:  __________________________________
____



Late entry note date:  _________________________________________________________
__











DATE: 19                                        ATTN:Pope Jeremy





Please exercise your independent, professional judgment in responding to the 
clarification form. 

Clinical indicators are provided on the bottom of this form for your review



Based on your clinical judgment, can you please specify the known or suspected 
condition being treated, evaluated or monitored?



Please check appropriate box(s):

[  ] Acute blood loss anemia

[  ] Chronic blood loss anemia                                 

[  ] Anemia unspecified

[  ] Other diagnosis ___________

[  ] Unable to determine



In addition, please specify:

Present on Admission (POA):  [  ] Yes             [  ] No             [  ] 
Unable to determine



For continuity of documentation, please document condition throughout progress 
notes and discharge summary.  Thank You.



CLINICAL INDICATORS - SIGNS / SYMPTOMS / LABS

OP Note  "PKE=[id number]" 

PN  "Normocytic anemia" 

PN  "likely from blood loss possible underlying ACD"

PN  "likely from blood loss in setting of iron deficiency anemia"

Labs RBC: =3.89 11/15=4.03 =3.34 =3.21

Labs Hgb: =11.3 11/15=11.5 =9.8 =9.4

Labs Hct: =33.5 11/15=34.2 =28.5 =27.7



RISK FACTORS

HP -36 weeks gestation

OP Note -GDM

OP Note -GHTN

OP Note -s/p 

OP Note -Abrasion of perineum

PN -Anemia







TREATMENTS:

OP Note -Laceration repair

Collected -Laboratory monitoring

MAR -IVF

MAR 11/14-Ferrous sulfate 324mg Oral











(This form is maintained as a part of the permanent medical record)

 [employer].  All Rights Reserved

[email or web address]    [not 
provided]

                                                              



MTDD

## 2022-07-05 ENCOUNTER — HOSPITAL ENCOUNTER (EMERGENCY)
Dept: HOSPITAL 92 - ERS | Age: 32
Discharge: HOME | End: 2022-07-05
Payer: SELF-PAY

## 2022-07-05 DIAGNOSIS — O03.9: Primary | ICD-10-CM

## 2022-07-05 LAB
ALBUMIN SERPL BCG-MCNC: 4.3 G/DL (ref 3.5–5)
ALP SERPL-CCNC: 62 U/L (ref 40–110)
ALT SERPL W P-5'-P-CCNC: 23 U/L (ref 8–55)
ANION GAP SERPL CALC-SCNC: 15 MMOL/L (ref 10–20)
AST SERPL-CCNC: 17 U/L (ref 5–34)
BASOPHILS # BLD AUTO: 0.1 THOU/UL (ref 0–0.2)
BASOPHILS NFR BLD AUTO: 0.8 % (ref 0–1)
BILIRUB SERPL-MCNC: 0.3 MG/DL (ref 0.2–1.2)
BUN SERPL-MCNC: 17 MG/DL (ref 7–18.7)
CALCIUM SERPL-MCNC: 9.7 MG/DL (ref 7.8–10.44)
CHLORIDE SERPL-SCNC: 106 MMOL/L (ref 98–107)
CO2 SERPL-SCNC: 21 MMOL/L (ref 22–29)
CREAT CL PREDICTED SERPL C-G-VRATE: 0 ML/MIN (ref 70–130)
EOSINOPHIL # BLD AUTO: 0.2 THOU/UL (ref 0–0.7)
EOSINOPHIL NFR BLD AUTO: 2.1 % (ref 0–10)
GLOBULIN SER CALC-MCNC: 3.6 G/DL (ref 2.4–3.5)
GLUCOSE SERPL-MCNC: 128 MG/DL (ref 70–105)
HGB BLD-MCNC: 13.8 G/DL (ref 12–16)
LYMPHOCYTES # BLD: 3.7 THOU/UL (ref 1.2–3.4)
LYMPHOCYTES NFR BLD AUTO: 41 % (ref 21–51)
MCH RBC QN AUTO: 29 PG (ref 27–31)
MCV RBC AUTO: 90.2 FL (ref 78–98)
MONOCYTES # BLD AUTO: 0.8 THOU/UL (ref 0.11–0.59)
MONOCYTES NFR BLD AUTO: 8.9 % (ref 0–10)
NEUTROPHILS # BLD AUTO: 4.2 THOU/UL (ref 1.4–6.5)
NEUTROPHILS NFR BLD AUTO: 47.2 % (ref 42–75)
PLATELET # BLD AUTO: 291 THOU/UL (ref 130–400)
POTASSIUM SERPL-SCNC: 4.2 MMOL/L (ref 3.5–5.1)
RBC # BLD AUTO: 4.75 MILL/UL (ref 4.2–5.4)
SODIUM SERPL-SCNC: 138 MMOL/L (ref 136–145)
WBC # BLD AUTO: 8.9 THOU/UL (ref 4.8–10.8)

## 2022-07-05 PROCEDURE — 36415 COLL VENOUS BLD VENIPUNCTURE: CPT

## 2022-07-05 PROCEDURE — 86901 BLOOD TYPING SEROLOGIC RH(D): CPT

## 2022-07-05 PROCEDURE — 76856 US EXAM PELVIC COMPLETE: CPT

## 2022-07-05 PROCEDURE — 86900 BLOOD TYPING SEROLOGIC ABO: CPT

## 2022-07-05 PROCEDURE — 85025 COMPLETE CBC W/AUTO DIFF WBC: CPT

## 2022-07-05 PROCEDURE — 84702 CHORIONIC GONADOTROPIN TEST: CPT

## 2022-07-05 PROCEDURE — 80053 COMPREHEN METABOLIC PANEL: CPT

## 2022-07-07 ENCOUNTER — HOSPITAL ENCOUNTER (EMERGENCY)
Dept: HOSPITAL 92 - ERS | Age: 32
LOS: 1 days | Discharge: HOME | End: 2022-07-08
Payer: COMMERCIAL

## 2022-07-07 DIAGNOSIS — Z3A.13: ICD-10-CM

## 2022-07-07 DIAGNOSIS — O03.9: Primary | ICD-10-CM

## 2022-07-07 LAB
BASOPHILS # BLD AUTO: 0 THOU/UL (ref 0–0.2)
BASOPHILS NFR BLD AUTO: 0.4 % (ref 0–1)
EOSINOPHIL # BLD AUTO: 0.2 THOU/UL (ref 0–0.7)
EOSINOPHIL NFR BLD AUTO: 1.8 % (ref 0–10)
HGB BLD-MCNC: 12.2 G/DL (ref 12–16)
LEUKOCYTE ESTERASE UR QL STRIP.AUTO: 250 LEU/UL
LYMPHOCYTES # BLD: 4 THOU/UL (ref 1.2–3.4)
LYMPHOCYTES NFR BLD AUTO: 43 % (ref 21–51)
MCH RBC QN AUTO: 29.6 PG (ref 27–31)
MCV RBC AUTO: 89.6 FL (ref 78–98)
MONOCYTES # BLD AUTO: 0.6 THOU/UL (ref 0.11–0.59)
MONOCYTES NFR BLD AUTO: 6.8 % (ref 0–10)
NEUTROPHILS # BLD AUTO: 4.4 THOU/UL (ref 1.4–6.5)
NEUTROPHILS NFR BLD AUTO: 47.9 % (ref 42–75)
PLATELET # BLD AUTO: 299 THOU/UL (ref 130–400)
PROT UR STRIP.AUTO-MCNC: 50 MG/DL
RBC # BLD AUTO: 4.13 MILL/UL (ref 4.2–5.4)
SP GR UR STRIP: 1.03 (ref 1–1.04)
WBC # BLD AUTO: 9.2 THOU/UL (ref 4.8–10.8)
WBC UR QL AUTO: (no result) HPF (ref 0–3)

## 2022-07-07 PROCEDURE — 85025 COMPLETE CBC W/AUTO DIFF WBC: CPT

## 2022-07-07 PROCEDURE — 81015 MICROSCOPIC EXAM OF URINE: CPT

## 2022-07-07 PROCEDURE — 84702 CHORIONIC GONADOTROPIN TEST: CPT

## 2022-07-07 PROCEDURE — 96375 TX/PRO/DX INJ NEW DRUG ADDON: CPT

## 2022-07-07 PROCEDURE — 81003 URINALYSIS AUTO W/O SCOPE: CPT

## 2022-07-07 PROCEDURE — 96365 THER/PROPH/DIAG IV INF INIT: CPT

## 2022-07-07 PROCEDURE — 93976 VASCULAR STUDY: CPT

## 2022-07-07 PROCEDURE — 76856 US EXAM PELVIC COMPLETE: CPT

## 2022-07-07 PROCEDURE — 86900 BLOOD TYPING SEROLOGIC ABO: CPT

## 2022-07-07 PROCEDURE — 36415 COLL VENOUS BLD VENIPUNCTURE: CPT

## 2022-07-07 PROCEDURE — 88305 TISSUE EXAM BY PATHOLOGIST: CPT

## 2022-07-07 PROCEDURE — 86901 BLOOD TYPING SEROLOGIC RH(D): CPT

## 2022-07-07 PROCEDURE — 86850 RBC ANTIBODY SCREEN: CPT
